# Patient Record
Sex: MALE | Race: WHITE | NOT HISPANIC OR LATINO | Employment: OTHER | URBAN - METROPOLITAN AREA
[De-identification: names, ages, dates, MRNs, and addresses within clinical notes are randomized per-mention and may not be internally consistent; named-entity substitution may affect disease eponyms.]

---

## 2017-10-18 ENCOUNTER — ESTABLISHED COMPREHENSIVE EXAM (OUTPATIENT)
Dept: URBAN - METROPOLITAN AREA CLINIC 43 | Facility: CLINIC | Age: 77
End: 2017-10-18

## 2017-10-18 DIAGNOSIS — H40.1132: ICD-10-CM

## 2017-10-18 DIAGNOSIS — H35.3131: ICD-10-CM

## 2017-10-18 PROCEDURE — 92014 COMPRE OPH EXAM EST PT 1/>: CPT

## 2017-10-18 PROCEDURE — G8428 CUR MEDS NOT DOCUMENT: HCPCS

## 2017-10-18 PROCEDURE — 2019F DILATED MACUL EXAM DONE: CPT

## 2017-10-18 PROCEDURE — G8785 BP SCRN NO PERF AT INTERVAL: HCPCS

## 2017-10-18 PROCEDURE — 4177F TALK PT/CRGVR RE AREDS PREV: CPT

## 2017-10-18 PROCEDURE — 2027F OPTIC NERVE HEAD EVAL DONE: CPT

## 2017-10-18 PROCEDURE — 3284F IOP RED >=15% PRE-NTRV LVL: CPT

## 2017-10-18 PROCEDURE — 92133 CPTRZD OPH DX IMG PST SGM ON: CPT

## 2017-10-18 PROCEDURE — 1036F TOBACCO NON-USER: CPT

## 2017-10-18 PROCEDURE — 92015 DETERMINE REFRACTIVE STATE: CPT

## 2017-10-18 ASSESSMENT — VISUAL ACUITY
OD_BAT: 20/50
OD_SC: J3
OS_BAT: 20/70
OS_SC: J3
OD_SC: 20/40+2
OS_SC: 20/40+1

## 2017-10-18 ASSESSMENT — TONOMETRY
OS_IOP_MMHG: 12
OD_IOP_MMHG: 10

## 2018-03-12 ENCOUNTER — IOP CHECK (OUTPATIENT)
Dept: URBAN - METROPOLITAN AREA CLINIC 43 | Facility: CLINIC | Age: 78
End: 2018-03-12

## 2018-03-12 DIAGNOSIS — H40.1132: ICD-10-CM

## 2018-03-12 PROCEDURE — G8785 BP SCRN NO PERF AT INTERVAL: HCPCS

## 2018-03-12 PROCEDURE — 2027F OPTIC NERVE HEAD EVAL DONE: CPT

## 2018-03-12 PROCEDURE — 92012 INTRM OPH EXAM EST PATIENT: CPT

## 2018-03-12 PROCEDURE — 3284F IOP RED >=15% PRE-NTRV LVL: CPT

## 2018-03-12 PROCEDURE — 92083 EXTENDED VISUAL FIELD XM: CPT

## 2018-03-12 PROCEDURE — G8428 CUR MEDS NOT DOCUMENT: HCPCS

## 2018-03-12 PROCEDURE — 1036F TOBACCO NON-USER: CPT

## 2018-03-12 ASSESSMENT — TONOMETRY
OS_IOP_MMHG: 11
OD_IOP_MMHG: 11

## 2018-03-12 ASSESSMENT — VISUAL ACUITY
OD_SC: 20/40+1
OS_SC: 20/50-2

## 2018-11-15 ENCOUNTER — IOP CHECK (OUTPATIENT)
Dept: URBAN - METROPOLITAN AREA CLINIC 43 | Facility: CLINIC | Age: 78
End: 2018-11-15

## 2018-11-15 DIAGNOSIS — H40.1132: ICD-10-CM

## 2018-11-15 PROCEDURE — G9903 PT SCRN TBCO ID AS NON USER: HCPCS

## 2018-11-15 PROCEDURE — 3284F IOP RED >=15% PRE-NTRV LVL: CPT

## 2018-11-15 PROCEDURE — 92012 INTRM OPH EXAM EST PATIENT: CPT

## 2018-11-15 PROCEDURE — 92083 EXTENDED VISUAL FIELD XM: CPT

## 2018-11-15 PROCEDURE — G8428 CUR MEDS NOT DOCUMENT: HCPCS

## 2018-11-15 PROCEDURE — 1036F TOBACCO NON-USER: CPT

## 2018-11-15 PROCEDURE — G8785 BP SCRN NO PERF AT INTERVAL: HCPCS

## 2018-11-15 PROCEDURE — 2027F OPTIC NERVE HEAD EVAL DONE: CPT

## 2018-11-15 ASSESSMENT — VISUAL ACUITY
OS_SC: 20/50-1
OD_SC: 20/40+2

## 2018-11-15 ASSESSMENT — TONOMETRY
OD_IOP_MMHG: 8.5
OS_IOP_MMHG: 8

## 2019-04-24 ENCOUNTER — ESTABLISHED COMPREHENSIVE EXAM (OUTPATIENT)
Dept: URBAN - METROPOLITAN AREA CLINIC 43 | Facility: CLINIC | Age: 79
End: 2019-04-24

## 2019-04-24 DIAGNOSIS — H40.1132: ICD-10-CM

## 2019-04-24 DIAGNOSIS — H25.9: ICD-10-CM

## 2019-04-24 DIAGNOSIS — H35.3131: ICD-10-CM

## 2019-04-24 PROCEDURE — 92015 DETERMINE REFRACTIVE STATE: CPT

## 2019-04-24 PROCEDURE — 92133 CPTRZD OPH DX IMG PST SGM ON: CPT

## 2019-04-24 PROCEDURE — 92014 COMPRE OPH EXAM EST PT 1/>: CPT

## 2019-04-24 ASSESSMENT — TONOMETRY
OD_IOP_MMHG: 12
OS_IOP_MMHG: 12

## 2019-04-24 ASSESSMENT — VISUAL ACUITY
OD_BAT: 20/400
OS_SC: 20/50-1
OD_SC: 20/50-1
OS_SC: J5
OD_SC: J5
OS_BAT: 20/400

## 2019-10-29 ENCOUNTER — IOP CHECK (OUTPATIENT)
Dept: URBAN - METROPOLITAN AREA CLINIC 43 | Facility: CLINIC | Age: 79
End: 2019-10-29

## 2019-10-29 DIAGNOSIS — H25.9: ICD-10-CM

## 2019-10-29 DIAGNOSIS — H40.1132: ICD-10-CM

## 2019-10-29 PROCEDURE — 92012 INTRM OPH EXAM EST PATIENT: CPT

## 2019-10-29 PROCEDURE — 92083 EXTENDED VISUAL FIELD XM: CPT

## 2019-10-29 ASSESSMENT — VISUAL ACUITY
OD_SC: 20/40-1+1
OS_SC: 20/50-2

## 2019-10-29 ASSESSMENT — TONOMETRY
OD_IOP_MMHG: 10
OS_IOP_MMHG: 9

## 2020-05-15 ENCOUNTER — ESTABLISHED COMPREHENSIVE EXAM (OUTPATIENT)
Dept: URBAN - METROPOLITAN AREA CLINIC 43 | Facility: CLINIC | Age: 80
End: 2020-05-15

## 2020-05-15 DIAGNOSIS — H35.3131: ICD-10-CM

## 2020-05-15 DIAGNOSIS — H25.9: ICD-10-CM

## 2020-05-15 DIAGNOSIS — H40.1132: ICD-10-CM

## 2020-05-15 PROCEDURE — 92133 CPTRZD OPH DX IMG PST SGM ON: CPT

## 2020-05-15 PROCEDURE — 92014 COMPRE OPH EXAM EST PT 1/>: CPT

## 2020-05-15 PROCEDURE — 92015 DETERMINE REFRACTIVE STATE: CPT

## 2020-05-15 ASSESSMENT — VISUAL ACUITY
OS_SC: 20/50-2
OD_BAT: 20/70-2
OD_SC: J3
OS_SC: J3
OS_BAT: 20/100
OD_SC: 20/60-2

## 2020-05-15 ASSESSMENT — TONOMETRY
OS_IOP_MMHG: 10
OD_IOP_MMHG: 10

## 2020-11-17 ENCOUNTER — IOP CHECK (OUTPATIENT)
Dept: URBAN - METROPOLITAN AREA CLINIC 43 | Facility: CLINIC | Age: 80
End: 2020-11-17

## 2020-11-17 DIAGNOSIS — H35.3131: ICD-10-CM

## 2020-11-17 DIAGNOSIS — H40.1132: ICD-10-CM

## 2020-11-17 DIAGNOSIS — H25.9: ICD-10-CM

## 2020-11-17 PROCEDURE — 92083 EXTENDED VISUAL FIELD XM: CPT

## 2020-11-17 PROCEDURE — 92250 FUNDUS PHOTOGRAPHY W/I&R: CPT

## 2020-11-17 PROCEDURE — 92012 INTRM OPH EXAM EST PATIENT: CPT

## 2020-11-17 ASSESSMENT — VISUAL ACUITY
OS_SC: 20/50-1
OD_SC: 20/40-2

## 2020-11-17 ASSESSMENT — TONOMETRY
OD_IOP_MMHG: 10
OS_IOP_MMHG: 8

## 2021-01-05 NOTE — PATIENT DISCUSSION
CORNEAL ULCER, OS:  [facility]CT [employer]. PRESCRIBE OCUFLOX Q15 MIN FOR 1 HR FOLLOWED BY 1 DROP EVERY HOUR THEREAFTER. RX EMCYIN [place] QHS. STAY OUT OF CONTACTS. RETURN FOR FOLLOW-UP AS SCHEDULED.

## 2021-01-05 NOTE — PATIENT DISCUSSION
New Prescription: Ocuflox (ofloxacin): drops: 0.3% 1 drop every hour as directed into affected eye 01-

## 2021-01-05 NOTE — PATIENT DISCUSSION
New Prescription: erythromycin (erythromycin): ointment: 5 mg/gram (0.5 %) a small amount at bedtime as needed for pain into affected eye 01-

## 2021-05-18 ENCOUNTER — ESTABLISHED COMPREHENSIVE EXAM (OUTPATIENT)
Dept: URBAN - METROPOLITAN AREA CLINIC 43 | Facility: CLINIC | Age: 81
End: 2021-05-18

## 2021-05-18 DIAGNOSIS — H40.1132: ICD-10-CM

## 2021-05-18 DIAGNOSIS — H35.3131: ICD-10-CM

## 2021-05-18 DIAGNOSIS — H25.9: ICD-10-CM

## 2021-05-18 PROCEDURE — 92015 DETERMINE REFRACTIVE STATE: CPT

## 2021-05-18 PROCEDURE — 92133 CPTRZD OPH DX IMG PST SGM ON: CPT

## 2021-05-18 PROCEDURE — 92014 COMPRE OPH EXAM EST PT 1/>: CPT

## 2021-05-18 ASSESSMENT — VISUAL ACUITY
OS_BAT: 20/400
OD_BAT: 20/100
OD_SC: J4-
OD_SC: 20/40-1
OS_SC: J3
OS_SC: 20/50-1

## 2021-05-18 ASSESSMENT — TONOMETRY
OD_IOP_MMHG: 10
OS_IOP_MMHG: 11

## 2021-11-18 ENCOUNTER — IOP CHECK (OUTPATIENT)
Dept: URBAN - METROPOLITAN AREA CLINIC 43 | Facility: CLINIC | Age: 81
End: 2021-11-18

## 2021-11-18 DIAGNOSIS — H25.9: ICD-10-CM

## 2021-11-18 DIAGNOSIS — H35.3131: ICD-10-CM

## 2021-11-18 DIAGNOSIS — H40.1132: ICD-10-CM

## 2021-11-18 PROCEDURE — 92012 INTRM OPH EXAM EST PATIENT: CPT

## 2021-11-18 PROCEDURE — 92083 EXTENDED VISUAL FIELD XM: CPT

## 2021-11-18 ASSESSMENT — TONOMETRY
OS_IOP_MMHG: 11
OD_IOP_MMHG: 11

## 2021-11-18 ASSESSMENT — VISUAL ACUITY
OD_SC: 20/40+2
OS_SC: 20/50+2

## 2022-05-11 ENCOUNTER — COMPREHENSIVE EXAM (OUTPATIENT)
Dept: URBAN - METROPOLITAN AREA CLINIC 43 | Facility: CLINIC | Age: 82
End: 2022-05-11

## 2022-05-11 DIAGNOSIS — H35.3131: ICD-10-CM

## 2022-05-11 DIAGNOSIS — H40.1132: ICD-10-CM

## 2022-05-11 DIAGNOSIS — H25.9: ICD-10-CM

## 2022-05-11 PROCEDURE — 92014 COMPRE OPH EXAM EST PT 1/>: CPT

## 2022-05-11 PROCEDURE — 92015 DETERMINE REFRACTIVE STATE: CPT

## 2022-05-11 PROCEDURE — 92133 CPTRZD OPH DX IMG PST SGM ON: CPT

## 2022-05-11 ASSESSMENT — VISUAL ACUITY
OD_SC: J3
OS_BAT: 20/400
OS_SC: J4-
OD_SC: 20/40
OS_SC: 20/50-1
OD_BAT: 20/200

## 2022-05-11 ASSESSMENT — TONOMETRY
OD_IOP_MMHG: 12
OS_IOP_MMHG: 13

## 2022-11-08 ENCOUNTER — FOLLOW UP (OUTPATIENT)
Dept: URBAN - METROPOLITAN AREA CLINIC 43 | Facility: CLINIC | Age: 82
End: 2022-11-08

## 2022-11-08 DIAGNOSIS — H35.3131: ICD-10-CM

## 2022-11-08 DIAGNOSIS — H40.1132: ICD-10-CM

## 2022-11-08 DIAGNOSIS — H25.9: ICD-10-CM

## 2022-11-08 PROCEDURE — 92083 EXTENDED VISUAL FIELD XM: CPT

## 2022-11-08 PROCEDURE — 92012 INTRM OPH EXAM EST PATIENT: CPT

## 2022-11-08 ASSESSMENT — VISUAL ACUITY
OS_SC: 20/50+2
OD_SC: 20/40-1
OU_SC: 20/40-1

## 2022-11-08 ASSESSMENT — TONOMETRY
OS_IOP_MMHG: 10
OD_IOP_MMHG: 12

## 2023-05-09 ENCOUNTER — COMPREHENSIVE EXAM (OUTPATIENT)
Dept: URBAN - METROPOLITAN AREA CLINIC 43 | Facility: CLINIC | Age: 83
End: 2023-05-09

## 2023-05-09 DIAGNOSIS — H40.1132: ICD-10-CM

## 2023-05-09 DIAGNOSIS — H25.9: ICD-10-CM

## 2023-05-09 DIAGNOSIS — H35.3131: ICD-10-CM

## 2023-05-09 PROCEDURE — 92015 DETERMINE REFRACTIVE STATE: CPT

## 2023-05-09 PROCEDURE — 92133 CPTRZD OPH DX IMG PST SGM ON: CPT

## 2023-05-09 PROCEDURE — 92014 COMPRE OPH EXAM EST PT 1/>: CPT

## 2023-05-09 RX ORDER — LATANOPROST 50 UG/ML: 1 SOLUTION/ DROPS OPHTHALMIC EVERY EVENING

## 2023-05-09 ASSESSMENT — TONOMETRY
OD_IOP_MMHG: 11
OS_IOP_MMHG: 13

## 2023-05-09 ASSESSMENT — VISUAL ACUITY
OS_SC: 20/30+1
OS_BAT: 20/50-1
OD_SC: J8
OD_SC: 20/30-2
OD_BAT: 20/40-1
OS_SC: J6-

## 2023-11-13 ENCOUNTER — FOLLOW UP (OUTPATIENT)
Dept: URBAN - METROPOLITAN AREA CLINIC 43 | Facility: CLINIC | Age: 83
End: 2023-11-13

## 2023-11-13 DIAGNOSIS — H35.3131: ICD-10-CM

## 2023-11-13 DIAGNOSIS — H25.9: ICD-10-CM

## 2023-11-13 DIAGNOSIS — H40.1132: ICD-10-CM

## 2023-11-13 PROCEDURE — 92083 EXTENDED VISUAL FIELD XM: CPT

## 2023-11-13 PROCEDURE — 92012 INTRM OPH EXAM EST PATIENT: CPT

## 2023-11-13 ASSESSMENT — TONOMETRY
OD_IOP_MMHG: 10
OS_IOP_MMHG: 10

## 2023-11-13 ASSESSMENT — VISUAL ACUITY
OS_SC: 20/50-2
OU_SC: 20/30-2
OD_SC: 20/40+2
OS_PH: 20/30-2

## 2024-05-30 ENCOUNTER — COMPREHENSIVE EXAM (OUTPATIENT)
Dept: URBAN - METROPOLITAN AREA CLINIC 43 | Facility: CLINIC | Age: 84
End: 2024-05-30

## 2024-05-30 DIAGNOSIS — H35.3131: ICD-10-CM

## 2024-05-30 DIAGNOSIS — H40.1132: ICD-10-CM

## 2024-05-30 DIAGNOSIS — H25.9: ICD-10-CM

## 2024-05-30 PROCEDURE — 92133 CPTRZD OPH DX IMG PST SGM ON: CPT

## 2024-05-30 PROCEDURE — 92014 COMPRE OPH EXAM EST PT 1/>: CPT

## 2024-05-30 PROCEDURE — 92015 DETERMINE REFRACTIVE STATE: CPT

## 2024-05-30 ASSESSMENT — VISUAL ACUITY
OS_BAT: 20/60
OS_SC: 20/40-1
OD_BAT: 20/40
OS_SC: J8
OD_SC: 20/40+1
OD_SC: J8

## 2024-05-30 ASSESSMENT — TONOMETRY
OS_IOP_MMHG: 12
OD_IOP_MMHG: 12

## 2024-12-03 ENCOUNTER — FOLLOW UP (OUTPATIENT)
Age: 84
End: 2024-12-03

## 2024-12-03 DIAGNOSIS — H25.9: ICD-10-CM

## 2024-12-03 DIAGNOSIS — H40.1132: ICD-10-CM

## 2024-12-03 DIAGNOSIS — H35.3131: ICD-10-CM

## 2024-12-03 PROCEDURE — 92014 COMPRE OPH EXAM EST PT 1/>: CPT

## 2024-12-03 PROCEDURE — 92083 EXTENDED VISUAL FIELD XM: CPT

## 2025-05-28 ENCOUNTER — COMPREHENSIVE EXAM (OUTPATIENT)
Age: 85
End: 2025-05-28

## 2025-05-28 DIAGNOSIS — H25.9: ICD-10-CM

## 2025-05-28 DIAGNOSIS — H35.3131: ICD-10-CM

## 2025-05-28 DIAGNOSIS — H40.1132: ICD-10-CM

## 2025-05-28 PROCEDURE — 92015 DETERMINE REFRACTIVE STATE: CPT

## 2025-05-28 PROCEDURE — 92133 CPTRZD OPH DX IMG PST SGM ON: CPT

## 2025-05-28 PROCEDURE — 92014 COMPRE OPH EXAM EST PT 1/>: CPT
